# Patient Record
Sex: FEMALE | Race: WHITE | URBAN - METROPOLITAN AREA
[De-identification: names, ages, dates, MRNs, and addresses within clinical notes are randomized per-mention and may not be internally consistent; named-entity substitution may affect disease eponyms.]

---

## 2018-09-11 ENCOUNTER — TELEPHONE (OUTPATIENT)
Dept: DERMATOLOGY | Facility: CLINIC | Age: 33
End: 2018-09-11

## 2018-09-11 NOTE — TELEPHONE ENCOUNTER
[facility] Call Center    Phone Message    May a detailed message be left on voicemail: yes    Reason for Call: Other: Referral to Dr Iglesias for hair loss. Recs/referral faxed to clinic. Please review and contact pt to schedule     Action Taken: Message routed to:  [facility] dermatology

## 2018-09-13 ENCOUNTER — PRE VISIT (OUTPATIENT)
Dept: DERMATOLOGY | Facility: CLINIC | Age: 33
End: 2018-09-13

## 2018-09-13 NOTE — TELEPHONE ENCOUNTER
FUTURE VISIT INFORMATION      FUTURE VISIT INFORMATION:    Date: N/A    Time:     Location:   REFERRAL INFORMATION:    Referring provider:  DR. HEAVEN GATICA    Referring providers clinic:  [facility]    Reason for visit/diagnosis  HAIR LOSS    RECORDS STATUS      RECORDS RECEIVED FROM: [facility]   DATE RECEIVED: 9/11/2018   NOTES STATUS DETAILS   OFFICE NOTE from referring provider Received    OFFICE NOTE from other specialist N/A    SCALP BIOPSY RESULTS N/A    HAIR LOSS PHOTOS N/A    LAB RESULTS Received    MEDICATION LIST N/A      Action    Action Taken ALL RECEIVED RECORDS WILL BE REVIEWED BY DR. SANCHEZ IN 2-3 WEEKS. PATIENT WILL THEN BE CONTACTED

## 2018-09-18 NOTE — TELEPHONE ENCOUNTER
Action    Action Taken RECORDS HAVE BEEN REVIEWED BY DR. SANCHEZ AND PATIENT HAS BEEN ACCEPTED INTO CLINIC. PATIENT MAY SCHEDULE [facility] APPOINTMENT.

## 2019-05-03 ENCOUNTER — DOCUMENTATION ONLY (OUTPATIENT)
Dept: CARE COORDINATION | Facility: CLINIC | Age: 34
End: 2019-05-03

## 2019-11-26 ENCOUNTER — OFFICE VISIT (OUTPATIENT)
Dept: DERMATOLOGY | Facility: CLINIC | Age: 34
End: 2019-11-26
Payer: COMMERCIAL

## 2019-11-26 VITALS — HEART RATE: 89 BPM | DIASTOLIC BLOOD PRESSURE: 87 MMHG | SYSTOLIC BLOOD PRESSURE: 128 MMHG

## 2019-11-26 DIAGNOSIS — R20.8 DYSESTHESIA OF SCALP: ICD-10-CM

## 2019-11-26 DIAGNOSIS — L65.9 LOSS OF HAIR: Primary | ICD-10-CM

## 2019-11-26 DIAGNOSIS — L21.9 DERMATITIS, SEBORRHEIC: ICD-10-CM

## 2019-11-26 RX ORDER — MULTIVIT-MIN/IRON/FOLIC ACID/K 18-600-40
2000 CAPSULE ORAL
COMMUNITY
Start: 2019-07-19

## 2019-11-26 RX ORDER — FLUOCINONIDE TOPICAL SOLUTION USP, 0.05% 0.5 MG/ML
SOLUTION TOPICAL
COMMUNITY
Start: 2018-08-24

## 2019-11-26 RX ORDER — OMEPRAZOLE 40 MG/1
40 CAPSULE, DELAYED RELEASE ORAL
COMMUNITY
Start: 2019-04-05

## 2019-11-26 RX ORDER — ZOLPIDEM TARTRATE 10 MG/1
TABLET ORAL
COMMUNITY
Start: 2019-11-26

## 2019-11-26 RX ORDER — VENLAFAXINE HYDROCHLORIDE 75 MG/1
75 CAPSULE, EXTENDED RELEASE ORAL
COMMUNITY
Start: 2019-10-11

## 2019-11-26 RX ORDER — MULTIPLE VITAMINS W/ MINERALS TAB 9MG-400MCG
1 TAB ORAL
COMMUNITY
Start: 2018-03-19

## 2019-11-26 RX ORDER — VALACYCLOVIR HYDROCHLORIDE 1 G/1
TABLET, FILM COATED ORAL
Refills: 1 | COMMUNITY
Start: 2019-11-21

## 2019-11-26 RX ORDER — LACOSAMIDE 100 MG/1
100 TABLET ORAL
COMMUNITY
Start: 2019-10-14

## 2019-11-26 RX ORDER — CLOBETASOL PROPIONATE 0.05 G/100ML
SHAMPOO TOPICAL
Refills: 10 | COMMUNITY
Start: 2019-05-10

## 2019-11-26 RX ORDER — SPIRONOLACTONE 100 MG/1
TABLET, FILM COATED ORAL
Refills: 1 | COMMUNITY
Start: 2019-10-25

## 2019-11-26 RX ORDER — DOXYCYCLINE 50 MG/1
CAPSULE ORAL
COMMUNITY
Start: 2018-05-17

## 2019-11-26 ASSESSMENT — PAIN SCALES - GENERAL
PAINLEVEL: NO PAIN (0)
PAINLEVEL: NO PAIN (0)

## 2019-11-26 NOTE — PROGRESS NOTES
"[facility] Dermatology Note      Dermatology Problem List:  DERMATOLOGY RN  1. Non-scarring alopecia with seborrheic dermatitis and scalp dysesthesia, DDx includes androgenetic alopecia vs chronic telogen effluvium  - biopsy 6/2018 at [facility] showed androgenetic alopecia  - s/p PRP x1 and XTRACT x 7 sessions  - s/p vertex scalp punch biopsies for H&E and epidermal nerve density on 11/26/19  - current regimen: ketoconazole shampoo, rogaine, clobetasol shampoo, fluocinonide solution    Encounter Date: Nov 26, 2019    CC:   Chief Complaint   Patient presents with     Hair Loss     Kasia is here today for new hair loss. Kasia notes\" I started having hair loss March 2018 that is all over.\"  Last biopsy June 2018          History of Present Illness:  Ms. Kasia Chisholm is a 34 year old female who presents as a referral from Dr. Zhanna Montalvo at [facility]. She had a scalp biopsy 6/2018 when she was diagnosed with androgenetic alopecia.      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 She has had hair loss since 2018 when she noticed her scalp was \"fiery red\" for a week. After the initial redness subsided, she reports she shed her hair over 50% of her scalp within 2-3 months. Hair loss is diffuse, but she notices thinning especially on her vertex, and occipital scalp. She had seborrheic dermatitis with thick scale; she also notices scale on her eyebrows occasionally. Most of the scale has since decreased, though has persistent mild itch on her temporal-occipital scalp. Her scalp is constantly tender and sensitive - she says it \"feels like a biopsy site\". When she wears her hair in a ponytail her scalp will hurt; her scalp would burn. She will notice some pressure on her scalp when she is lying down. She has not noticed hair loss elsewhere, facial papules, or rashes on her body otherwise. She thinks she has some atrophy on her scalp due to steroid medications " "and feels a \"ridge\" on her scalp.      Current treatment includes shampooing daily (rotating Denorex shampoo, T/Sal shampoo, T/Gel, ketoconazole shampoo, clobetasol shampoo (when scalp is symptomatic)), spironolactone 100 mg daily, topical minoxidil daily, and fluocinonide solution PRN. She reports Denorex shampoo subsides symptoms most. She also uses Joico conditioner products on her hair only. She noted hair shedding stopped for 2 months when she stopped blow drying, dying and applying any hair products, and began again when she blow dried her hair. She dyes her hair q4-5m. Past treatments include XTRAC laser (7 treatments q3wk) and PRP treatment once (stopped because scalp was painful).      She was also diagnosed with epilepsy in 2016 when she noticed vision changes (no seizures) and is currently taking epilepsy medications. She first tried Topiramate then switched to Vimpat 100 mg daily 4 months ago to decrease hair shedding side effects. She is also taking a vitamin D supplement, venlafaxine 75 mg daily (she believes this may help if depression is causing hair loss), Omeprazole, and Mirena to regulate her menstrual cycles. She denies anemia and vitamin D deficiency. She has no personal or family history of psoriasis.      Otherwise the patient is feeling well and has no other skin concerns.     Past Medical History:   There is no problem list on file for this patient.    No past medical history on file.  No past surgical history on file.    Social History:  Patient reports that she has never smoked. She has never used smokeless tobacco.    Family History:  No family history on file.    Medications:  Current Outpatient Medications   Medication Sig Dispense Refill     Cholecalciferol (VITAMIN D) 50 MCG (2000 UT) CAPS Take 2,000 Units by mouth       clobetasol propionate (CLOBEX) 0.05 % external shampoo APPLY TO SCALP QOD  10     doxycycline monohydrate (MONODOX) 50 MG capsule TK 1 C PO QD       fluocinonide " (LIDEX) 0.05 % external solution APPLY TOPICALLY TO SCALP 1-2 TIMES D FOR 1-2 WEEKS UNTIL RESOLVED. THEN APPLY PRN       Lacosamide (VIMPAT) 100 MG TABS tablet Take 100 mg by mouth       levonorgestrel (MIRENA) 20 MCG/24HR IUD 1 Intra Uterine Device by Intrauterine route       multivitamin w/minerals (MULTI-VITAMIN) tablet Take 1 tablet by mouth       omeprazole (PRILOSEC) 40 MG DR capsule Take 40 mg by mouth       spironolactone (ALDACTONE) 100 MG tablet TK 1 T PO HS  1     valACYclovir (VALTREX) 1000 mg tablet TK 2 TS PO AT ONSET OF SYMPTOMS AND 2 TS PO 12 HOURS LATER  1     venlafaxine (EFFEXOR-XR) 75 MG 24 hr capsule Take 75 mg by mouth       zolpidem (AMBIEN) 10 MG tablet TAKE 1 TABLET BY MOUTH EVERY EVENING AT BEDTIME AS NEEDED FOR SLEEP( MUST LAST 30 DAYS)          Allergies   Allergen Reactions     Fd&C Blue #2 [place]-Levetiracetam Other (See Comments)     Dry mouth and ineffective for seizures     Fd&C Yellow #10 [place]-Topiramate Other (See Comments)     Cognitive changes, hair loss and dry mouth     Morphine      halucinations     Sulfa Drugs Other (See Comments)     Gets thrush     Zonisamide Other (See Comments)     Dizziness and dry mouth         Review of Systems:  -As per HPI  -Constitutional: Otherwise feeling well today, in usual state of health.  -HEENT: Patient denies nonhealing oral sores.  -Skin: As above in HPI. No additional skin concerns.    Physical exam:  Vitals: /87   Pulse 89   GEN: This is a well developed, well-nourished female in no acute distress, in a pleasant mood.    SKIN: Focused examination of the scalp, eyebrows, eyelashes, nails, and face was performed.  -Dhillon skin type: II  - Eyebrows and eyelashes are of normal hair density.   - Nails are within normal limits.   - Absence of facial papules  - Decrease in hair density throughout scalp overall, especially on occipital scalp  - Mild perifollicular erythema, mild scale  - Negative hair pull test   -  Varying hair fiber diameters  - Absence of Prince-tree pattern on frontal scalp  - Part width 1.1 on frontal scalp, 1.2 on vertex, 1.1 on occipital scalp  - Regrowth layers              1st: 0.5 cm              2nd: 3-4 cm, robust              3rd: 8 cm, robust              4th: 12-14 cm  -No other lesions of concern on areas examined.     Impression/Plan:  1. Non-scarring alopecia with seborrheic dermatitis and scalp dysesthesia: Right occipital scalp biopsy 6/2018 at outside derm and diagnosed with androgenetic alopecia. There are a number of possible underlying etiologies of Kasia's hair loss. She describes very rapid hair loss which is suggestive of telogen effluvium in the setting of her epilepsy. She also has notable scalp erythema and some flaking which indicates seborrheic dermatitis. Since there is a component of scalp dysesthesia, we opt to perform an epidermal nerve density biopsy. She will likely need topical gabapentin to control her symptoms. At this time, we recommend continuing her current treatment for seborrheic dermatitis and androgenetic alopecia as outlined below.     Continue ketoconazole shampoo alternating with clobetasol shampoo with hairwashing    Continue fluocinonide solution to scalp 1-2 times daily PRN    Continue rogaine 1-2 times daily to scalp as tolerated    Labs pending 11/26/19: CBC w/diff, ferritin, iron, TSH w/ reflex T4, vitamin D    Punch biopsy:  After discussion of benefits and risks including but not limited to bleeding/bruising, pain/swelling, infection, scar, incomplete removal, nerve damage/numbness, recurrence, and non-diagnostic biopsy, written consent, verbal consent and photographs were obtained. Time-out was performed. The area was cleaned with isopropyl alcohol. 0.5mL of 1% lidocaine was injected to obtain adequate anesthesia of the area on the scalp.  Three 4 mm punch biopsies were performed. 4-0 prolene sutures were utilized to approximate the epidermal  edges. White petroleum jelly/Vaseline and a bandage was applied to the wound. Explicit verbal and written wound care instructions were provided. The patient left the Dermatology Clinic in good condition. The patient was counseled to follow up for suture removal in approximately 10-14 days.     Photodocumentation taken today 11/26/19    CC Zhanna Montalvo MD  [facility] DERMATOLOGY  [address]  [place] [address] on close of this encounter.    Phone follow-up pending biopsy and lab results.     Dr. Iglesias staffed the patient.    Staff Involved:  Scribe/Resident(Savannah Ramos)/Staff(as above)     Scribe Disclosure:  I, Sandy Jacobs, am serving as a scribe to document services personally performed by Dr. Roberta Iglesias MD, based on data collection and the provider's statements to me.      Savannah Ramos M.D.  PGY-4 Resident  Dermatology  [facility]     Provider Disclosure:   I agree with above History, Review of Systems, Physical exam and Plan. I have reviewed the content of the documentation and have edited it as needed. I have personally performed the services documented here and the documentation accurately represents those services and the decisions I have made.  Ms. Mantilla was also seen with the dermatology resident, Dr. Ramos. I was present for the key portions of the biopsy procedures which were done with Dr. Ramos.    Roberta Iglesias MD  Professor and Chair  Department of Dermatology  [facility]

## 2019-11-26 NOTE — LETTER
"11/26/2019       RE: Kasia Chisholm  [address] [address]     Dear Colleague,    Thank you for referring your patient, Kasia Chisholm, to the [facility] DERMATOLOGY at [facility]. Please see a copy of my visit note below.    [facility] Dermatology Note      Dermatology Problem List:  DERMATOLOGY RN  1. Non-scarring alopecia with seborrheic dermatitis and scalp dysesthesia, DDx includes androgenetic alopecia vs chronic telogen effluvium  - biopsy 6/2018 at [facility] derm showed androgenetic alopecia  - s/p PRP x1 and XTRACT x 7 sessions  - s/p vertex scalp punch biopsies for H&E and epidermal nerve density on 11/26/19  - current regimen: ketoconazole shampoo, rogaine, clobetasol shampoo, fluocinonide solution    Encounter Date: Nov 26, 2019    CC:   Chief Complaint   Patient presents with     Hair Loss     Kasia is here today for new hair loss. Kasia notes\" I started having hair loss March 2018 that is all over.\"  Last biopsy June 2018          History of Present Illness:  Ms. Kasia Chisholm is a 34 year old female who presents as a referral from Dr. Zhanna Montalvo at [facility]. She had a scalp biopsy 6/2018 when she was diagnosed with androgenetic alopecia.      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 She has had hair loss since 2018 when she noticed her scalp was \"fiery red\" for a week. After the initial redness subsided, she reports she shed her hair over 50% of her scalp within 2-3 months. Hair loss is diffuse, but she notices thinning especially on her vertex, and occipital scalp. She had seborrheic dermatitis with thick scale; she also notices scale on her eyebrows occasionally. Most of the scale has since decreased, though has persistent mild itch on her temporal-occipital scalp. Her scalp is constantly tender and sensitive - she says it \"feels like a biopsy site\". When she wears her " "hair in a ponytail her scalp will hurt; her scalp would burn. She will notice some pressure on her scalp when she is lying down. She has not noticed hair loss elsewhere, facial papules, or rashes on her body otherwise. She thinks she has some atrophy on her scalp due to steroid medications and feels a \"ridge\" on her scalp.      Current treatment includes shampooing daily (rotating Denorex shampoo, T/Sal shampoo, T/Gel, ketoconazole shampoo, clobetasol shampoo (when scalp is symptomatic)), spironolactone 100 mg daily, topical minoxidil daily, and fluocinonide solution PRN. She reports Denorex shampoo subsides symptoms most. She also uses Joico conditioner products on her hair only. She noted hair shedding stopped for 2 months when she stopped blow drying, dying and applying any hair products, and began again when she blow dried her hair. She dyes her hair q4-5m. Past treatments include XTRAC laser (7 treatments q3wk) and PRP treatment once (stopped because scalp was painful).      She was also diagnosed with epilepsy in 2016 when she noticed vision changes (no seizures) and is currently taking epilepsy medications. She first tried Topiramate then switched to Vimpat 100 mg daily 4 months ago to decrease hair shedding side effects. She is also taking a vitamin D supplement, venlafaxine 75 mg daily (she believes this may help if depression is causing hair loss), Omeprazole, and Mirena to regulate her menstrual cycles. She denies anemia and vitamin D deficiency. She has no personal or family history of psoriasis.      Otherwise the patient is feeling well and has no other skin concerns.     Past Medical History:   There is no problem list on file for this patient.    No past medical history on file.  No past surgical history on file.    Social History:  Patient reports that she has never smoked. She has never used smokeless tobacco.    Family History:  No family history on file.    Medications:  Current Outpatient " Medications   Medication Sig Dispense Refill     Cholecalciferol (VITAMIN D) 50 MCG (2000 UT) CAPS Take 2,000 Units by mouth       clobetasol propionate (CLOBEX) 0.05 % external shampoo APPLY TO SCALP QOD  10     doxycycline monohydrate (MONODOX) 50 MG capsule TK 1 C PO QD       fluocinonide (LIDEX) 0.05 % external solution APPLY TOPICALLY TO SCALP 1-2 TIMES D FOR 1-2 WEEKS UNTIL RESOLVED. THEN APPLY PRN       Lacosamide (VIMPAT) 100 MG TABS tablet Take 100 mg by mouth       levonorgestrel (MIRENA) 20 MCG/24HR IUD 1 Intra Uterine Device by Intrauterine route       multivitamin w/minerals (MULTI-VITAMIN) tablet Take 1 tablet by mouth       omeprazole (PRILOSEC) 40 MG DR capsule Take 40 mg by mouth       spironolactone (ALDACTONE) 100 MG tablet TK 1 T PO HS  1     valACYclovir (VALTREX) 1000 mg tablet TK 2 TS PO AT ONSET OF SYMPTOMS AND 2 TS PO 12 HOURS LATER  1     venlafaxine (EFFEXOR-XR) 75 MG 24 hr capsule Take 75 mg by mouth       zolpidem (AMBIEN) 10 MG tablet TAKE 1 TABLET BY MOUTH EVERY EVENING AT BEDTIME AS NEEDED FOR SLEEP( MUST LAST 30 DAYS)          Allergies   Allergen Reactions     Fd&C Blue #2 [place]-Levetiracetam Other (See Comments)     Dry mouth and ineffective for seizures     Fd&C Yellow #10 [place]-Topiramate Other (See Comments)     Cognitive changes, hair loss and dry mouth     Morphine      halucinations     Sulfa Drugs Other (See Comments)     Gets thrush     Zonisamide Other (See Comments)     Dizziness and dry mouth         Review of Systems:  -As per HPI  -Constitutional: Otherwise feeling well today, in usual state of health.  -HEENT: Patient denies nonhealing oral sores.  -Skin: As above in HPI. No additional skin concerns.    Physical exam:  Vitals: /87   Pulse 89   GEN: This is a well developed, well-nourished female in no acute distress, in a pleasant mood.    SKIN: Focused examination of the scalp, eyebrows, eyelashes, nails, and face was performed.  -Dhillon  skin type: II  - Eyebrows and eyelashes are of normal hair density.   - Nails are within normal limits.   - Absence of facial papules  - Decrease in hair density throughout scalp overall, especially on occipital scalp  - Mild perifollicular erythema, mild scale  - Negative hair pull test   - Varying hair fiber diameters  - Absence of [place]-tree pattern on frontal scalp  - Part width 1.1 on frontal scalp, 1.2 on vertex, 1.1 on occipital scalp  - Regrowth layers              1st: 0.5 cm              2nd: 3-4 cm, robust              3rd: 8 cm, robust              4th: 12-14 cm  -No other lesions of concern on areas examined.     Impression/Plan:  1. Non-scarring alopecia with seborrheic dermatitis  and scalp dysesthesia: Right occipital scalp biopsy 6/2018 at outside derm and diagnosed with androgenetic alopecia. There are a number of possible underlying etiologies of Kasia's hair loss. She describes very rapid hair loss which is suggestive of telogen effluvium in the setting of her epilepsy. She also has notable scalp erythema and some flaking which indicates seborrheic dermatitis. Since there is a component of scalp dysesthesia, we opt to perform an epidermal nerve density biopsy. She will likely need topical gabapentin to control her symptoms. At this time, we recommend continuing her current treatment for seborrheic dermatitis and androgenetic alopecia as outlined below.     Continue ketoconazole shampoo alternating with clobetasol shampoo with hairwashing    Continue fluocinonide solution to scalp 1-2 times daily PRN    Continue rogaine 1-2 times daily to scalp as tolerated    Labs pending 11/26/19: CBC w/diff, ferritin, iron, TSH w/ reflex T4, vitamin D    Punch biopsy:  After discussion of benefits and risks including but not limited to bleeding/bruising, pain/swelling, infection, scar, incomplete removal, nerve damage/numbness, recurrence, and non-diagnostic biopsy, written consent, verbal consent and  photographs were obtained. Time-out was performed. The area was cleaned with isopropyl alcohol. 0.5mL of 1% lidocaine was injected to obtain adequate anesthesia of the area on the scalp.   Three 4 mm punch biopsies were performed. 4-0 prolene sutures were utilized to approximate the epidermal edges. White petroleum jelly/Vaseline and a bandage was applied to the wound. Explicit verbal and written wound care instructions were provided. The patient left the Dermatology Clinic in good condition. The patient was counseled to follow up for suture removal in approximately 10-14 days.     Photodocumentation taken today 11/26/19    CC Zhanna Montalvo MD  [facility] DERMATOLOGY  [address]  [place] [address] on close of this encounter.    Phone follow-up pending biopsy and lab results.     Dr. Iglesias staffed the patient.    Staff Involved:  Scribe/Resident(Savannah Ramos)/Staff(as above)     Scribe Disclosure:  I, Sandy Jacobs, am serving as a scribe to document services personally performed by Dr. Roberta Iglesias MD, based on data collection and the provider's statements to me.      Savannah Ramos M.D.  PGY-4 Resident  Dermatology  [facility]     Provider Disclosure:   I agree with above History, Review of Systems, Physical exam and Plan. I have reviewed the content of the documentation and have edited it as needed. I have personally performed the services documented here and the documentation accurately represents those services and the decisions I have made.  Ms. Mantilla was also seen with the dermatology resident, Dr. Ramos. I was present for the key portions of the biopsy procedures which were done with Dr. Ramos.    Roberta Iglesias MD  Professor and Chair  Department of Dermatology  [facility]                                    Pictures were placed in Pt's chart today for future reference.      Again, thank you for allowing me to participate in the care of your patient.       Sincerely,    Roberta Iglesias MD

## 2019-11-26 NOTE — NURSING NOTE
"Dermatology Rooming Note    Kasia Chisholm's goals for this visit include:   Chief Complaint   Patient presents with     Hair Loss     Kasia is here today for new hair loss. Kasia notes\" I started having hair loss March 2018 that is all over.\"  Last biopsy June 2018      Sugey Blake, JT    "

## 2019-11-26 NOTE — PATIENT INSTRUCTIONS

## 2019-12-16 ENCOUNTER — HEALTH MAINTENANCE LETTER (OUTPATIENT)
Age: 34
End: 2019-12-16

## 2019-12-17 ENCOUNTER — TELEPHONE (OUTPATIENT)
Dept: DERMATOLOGY | Facility: CLINIC | Age: 34
End: 2019-12-17

## 2019-12-17 LAB — LAB SCANNED RESULT: NORMAL

## 2019-12-17 NOTE — TELEPHONE ENCOUNTER
[facility] Call Center    Phone Message    May a detailed message be left on voicemail: yes    Reason for Call: Other: Patient is calling in and wanting to know if her biopsy results are in from 3 weeks ago also if you recieved her lab results from the lab in [place] where she had them done. Please follow up with the patient to discuss thank you.     Action Taken: Message routed to:  [facility] derm

## 2019-12-17 NOTE — LETTER
DERMATOLOGY  [address] [facility]  [id number]  [facility] [address]  [phone]           December 19, 2019      Kasia Chisholm  [address] [place], [place] [id number]       Dear Ms. Annalee Chisholm,    Enclosed is a copy of your nerve fiber bx results. You will be receiving a call from Brea to set up a phone call with Dr Iglesias to discuss these results as well as your pathology and lab results.      If you have any questions or concerns, please call my nurse at [phone].      Sincerely,      Roberta Iglesias MD

## 2019-12-18 LAB — COPATH REPORT: NORMAL

## 2019-12-19 NOTE — TELEPHONE ENCOUNTER
Nerve fiber bx results sent in mail today. LVM for pt letting her know this and that she should be expecting a phone call from Brea to set up a phone call with Dr Iglesias to discuss those results as well as the other labs.

## 2020-01-29 NOTE — TELEPHONE ENCOUNTER
[facility] Call Center    Phone Message    May a detailed message be left on voicemail: yes    Reason for Call: Other: Patient is calling in for Brea.   She has been waiting since December for a call back regarding her biopsy results she stated she has never received a call from anyone only the [facility] message saying Brea would call her. She did ask to speak to the clinic manager and I did transfer her to Roberta not sure if she got the results so please follow up with the patient for results if need be. Thank you.  Action Taken: Message routed to:  [facility] derm

## 2020-01-30 NOTE — TELEPHONE ENCOUNTER
Writer talked to patient and reached out to Brea. Brea left a message for the patient.    JT Lowry

## 2021-01-15 ENCOUNTER — HEALTH MAINTENANCE LETTER (OUTPATIENT)
Age: 36
End: 2021-01-15

## 2021-06-02 ENCOUNTER — RECORDS - HEALTHEAST (OUTPATIENT)
Dept: ADMINISTRATIVE | Facility: CLINIC | Age: 36
End: 2021-06-02

## 2021-09-04 ENCOUNTER — HEALTH MAINTENANCE LETTER (OUTPATIENT)
Age: 36
End: 2021-09-04

## 2022-02-19 ENCOUNTER — HEALTH MAINTENANCE LETTER (OUTPATIENT)
Age: 37
End: 2022-02-19

## 2022-10-22 ENCOUNTER — HEALTH MAINTENANCE LETTER (OUTPATIENT)
Age: 37
End: 2022-10-22

## 2023-04-01 ENCOUNTER — HEALTH MAINTENANCE LETTER (OUTPATIENT)
Age: 38
End: 2023-04-01